# Patient Record
Sex: FEMALE | Race: WHITE | Employment: FULL TIME | URBAN - METROPOLITAN AREA
[De-identification: names, ages, dates, MRNs, and addresses within clinical notes are randomized per-mention and may not be internally consistent; named-entity substitution may affect disease eponyms.]

---

## 2017-01-05 ENCOUNTER — OFFICE VISIT (OUTPATIENT)
Dept: INTERNAL MEDICINE CLINIC | Age: 62
End: 2017-01-05

## 2017-01-05 DIAGNOSIS — M54.12 CERVICAL RADICULOPATHY: Primary | ICD-10-CM

## 2017-01-05 PROCEDURE — 99213 OFFICE O/P EST LOW 20 MIN: CPT | Performed by: NURSE PRACTITIONER

## 2017-01-05 RX ORDER — PREDNISONE 20 MG/1
20 TABLET ORAL DAILY
Qty: 5 TABLET | Refills: 0 | Status: SHIPPED | OUTPATIENT
Start: 2017-01-05 | End: 2017-01-10

## 2017-01-08 VITALS
BODY MASS INDEX: 40.72 KG/M2 | HEART RATE: 78 BPM | SYSTOLIC BLOOD PRESSURE: 130 MMHG | WEIGHT: 202 LBS | RESPIRATION RATE: 16 BRPM | DIASTOLIC BLOOD PRESSURE: 80 MMHG | HEIGHT: 59 IN

## 2017-01-19 ENCOUNTER — OFFICE VISIT (OUTPATIENT)
Dept: INTERNAL MEDICINE CLINIC | Age: 62
End: 2017-01-19

## 2017-01-19 VITALS — HEIGHT: 59 IN | WEIGHT: 197 LBS | BODY MASS INDEX: 39.72 KG/M2

## 2017-01-19 DIAGNOSIS — J01.90 ACUTE SINUSITIS, RECURRENCE NOT SPECIFIED, UNSPECIFIED LOCATION: Primary | ICD-10-CM

## 2017-01-19 PROCEDURE — 99213 OFFICE O/P EST LOW 20 MIN: CPT | Performed by: NURSE PRACTITIONER

## 2017-01-19 RX ORDER — AMOXICILLIN AND CLAVULANATE POTASSIUM 875; 125 MG/1; MG/1
1 TABLET, FILM COATED ORAL 2 TIMES DAILY
Qty: 14 TABLET | Refills: 0 | Status: SHIPPED | OUTPATIENT
Start: 2017-01-19 | End: 2017-01-26

## 2017-01-19 ASSESSMENT — ENCOUNTER SYMPTOMS
DIARRHEA: 0
SINUS PAIN: 1
VOMITING: 0
SORE THROAT: 1
WHEEZING: 0
NAUSEA: 0
VOICE CHANGE: 1
RHINORRHEA: 1
COUGH: 1
SWOLLEN GLANDS: 0

## 2017-01-26 ENCOUNTER — OFFICE VISIT (OUTPATIENT)
Dept: INTERNAL MEDICINE CLINIC | Age: 62
End: 2017-01-26

## 2017-01-26 VITALS
DIASTOLIC BLOOD PRESSURE: 80 MMHG | HEART RATE: 70 BPM | RESPIRATION RATE: 14 BRPM | HEIGHT: 59 IN | SYSTOLIC BLOOD PRESSURE: 110 MMHG | WEIGHT: 195 LBS | BODY MASS INDEX: 39.31 KG/M2

## 2017-01-26 DIAGNOSIS — E66.01 MORBID OBESITY WITH BMI OF 40.0-44.9, ADULT (HCC): ICD-10-CM

## 2017-01-26 DIAGNOSIS — J30.2 SEASONAL ALLERGIC RHINITIS, UNSPECIFIED ALLERGIC RHINITIS TRIGGER: ICD-10-CM

## 2017-01-26 DIAGNOSIS — T78.3XXS: ICD-10-CM

## 2017-01-26 DIAGNOSIS — R74.8 ELEVATED ALKALINE PHOSPHATASE LEVEL: ICD-10-CM

## 2017-01-26 DIAGNOSIS — E78.2 MIXED HYPERLIPIDEMIA: ICD-10-CM

## 2017-01-26 DIAGNOSIS — J45.20 MILD INTERMITTENT ASTHMA WITHOUT COMPLICATION: Primary | ICD-10-CM

## 2017-01-26 PROCEDURE — 99214 OFFICE O/P EST MOD 30 MIN: CPT | Performed by: INTERNAL MEDICINE

## 2017-01-26 RX ORDER — ATORVASTATIN CALCIUM 40 MG/1
TABLET, FILM COATED ORAL
Qty: 30 TABLET | Refills: 2 | Status: SHIPPED | OUTPATIENT
Start: 2017-01-26 | End: 2017-04-28 | Stop reason: SDUPTHER

## 2017-01-26 RX ORDER — FLUTICASONE PROPIONATE 50 MCG
2 SPRAY, SUSPENSION (ML) NASAL DAILY
Qty: 1 BOTTLE | Refills: 2 | Status: SHIPPED | OUTPATIENT
Start: 2017-01-26

## 2017-01-26 RX ORDER — TRIAMTERENE AND HYDROCHLOROTHIAZIDE 75; 50 MG/1; MG/1
TABLET ORAL
Qty: 30 TABLET | Refills: 2 | Status: SHIPPED | OUTPATIENT
Start: 2017-01-26 | End: 2017-04-28 | Stop reason: SDUPTHER

## 2017-01-26 ASSESSMENT — ENCOUNTER SYMPTOMS
NAUSEA: 0
RHINORRHEA: 0
BACK PAIN: 0
SHORTNESS OF BREATH: 0
VOMITING: 0
CHEST TIGHTNESS: 0
ABDOMINAL PAIN: 0
WHEEZING: 0

## 2017-04-28 ENCOUNTER — OFFICE VISIT (OUTPATIENT)
Dept: INTERNAL MEDICINE CLINIC | Age: 62
End: 2017-04-28

## 2017-04-28 VITALS
HEART RATE: 70 BPM | RESPIRATION RATE: 14 BRPM | WEIGHT: 196 LBS | DIASTOLIC BLOOD PRESSURE: 70 MMHG | SYSTOLIC BLOOD PRESSURE: 100 MMHG | HEIGHT: 59 IN | BODY MASS INDEX: 39.51 KG/M2

## 2017-04-28 DIAGNOSIS — R74.8 ELEVATED ALKALINE PHOSPHATASE LEVEL: ICD-10-CM

## 2017-04-28 DIAGNOSIS — T78.3XXS: ICD-10-CM

## 2017-04-28 DIAGNOSIS — J30.2 SEASONAL ALLERGIC RHINITIS, UNSPECIFIED ALLERGIC RHINITIS TRIGGER: ICD-10-CM

## 2017-04-28 DIAGNOSIS — E66.01 MORBID OBESITY WITH BMI OF 40.0-44.9, ADULT (HCC): ICD-10-CM

## 2017-04-28 DIAGNOSIS — E78.2 MIXED HYPERLIPIDEMIA: ICD-10-CM

## 2017-04-28 DIAGNOSIS — Z11.59 NEED FOR HEPATITIS C SCREENING TEST: ICD-10-CM

## 2017-04-28 DIAGNOSIS — Z11.4 SCREENING FOR HIV WITHOUT PRESENCE OF RISK FACTORS: ICD-10-CM

## 2017-04-28 DIAGNOSIS — J45.20 MILD INTERMITTENT ASTHMA WITHOUT COMPLICATION: Primary | ICD-10-CM

## 2017-04-28 PROCEDURE — 99214 OFFICE O/P EST MOD 30 MIN: CPT | Performed by: INTERNAL MEDICINE

## 2017-04-28 RX ORDER — VALACYCLOVIR HYDROCHLORIDE 1 G/1
1000 TABLET, FILM COATED ORAL 2 TIMES DAILY
Qty: 30 TABLET | Refills: 2 | Status: SHIPPED | OUTPATIENT
Start: 2017-04-28

## 2017-04-28 RX ORDER — TRIAMTERENE AND HYDROCHLOROTHIAZIDE 75; 50 MG/1; MG/1
TABLET ORAL
Qty: 30 TABLET | Refills: 2 | Status: SHIPPED | OUTPATIENT
Start: 2017-04-28

## 2017-04-28 RX ORDER — ATORVASTATIN CALCIUM 40 MG/1
TABLET, FILM COATED ORAL
Qty: 30 TABLET | Refills: 2 | Status: SHIPPED | OUTPATIENT
Start: 2017-04-28

## 2017-04-28 ASSESSMENT — ENCOUNTER SYMPTOMS
ABDOMINAL PAIN: 0
BACK PAIN: 0
CHEST TIGHTNESS: 0
WHEEZING: 0
VOMITING: 0
SHORTNESS OF BREATH: 0
RHINORRHEA: 0
NAUSEA: 0

## 2017-05-06 ENCOUNTER — HOSPITAL ENCOUNTER (OUTPATIENT)
Dept: OTHER | Age: 62
Discharge: HOME | End: 2017-05-06
Attending: INTERNAL MEDICINE | Admitting: INTERNAL MEDICINE

## 2017-05-06 LAB
A/G RATIO: 1.2 (ref 1.1–2.2)
ALBUMIN SERPL-MCNC: 3.8 G/DL (ref 3.4–5)
ALP BLD-CCNC: 147 U/L (ref 40–129)
ALT SERPL-CCNC: 16 U/L (ref 10–40)
ANION GAP SERPL CALCULATED.3IONS-SCNC: 14 MMOL/L (ref 3–16)
AST SERPL-CCNC: 18 U/L (ref 15–37)
BASOPHILS ABSOLUTE: 0.1 K/UL (ref 0–0.2)
BASOPHILS RELATIVE PERCENT: 0.9 %
BILIRUB SERPL-MCNC: 0.4 MG/DL (ref 0–1)
BUN BLDV-MCNC: 21 MG/DL (ref 7–20)
CALCIUM SERPL-MCNC: 9.2 MG/DL (ref 8.3–10.6)
CHLORIDE BLD-SCNC: 96 MMOL/L (ref 99–110)
CHOLESTEROL, TOTAL: 168 MG/DL (ref 0–199)
CO2: 31 MMOL/L (ref 21–32)
CREAT SERPL-MCNC: 1.1 MG/DL (ref 0.6–1.1)
EOSINOPHILS ABSOLUTE: 0.1 K/UL (ref 0–0.6)
EOSINOPHILS RELATIVE PERCENT: 1.7 %
GFR AFRICAN AMERICAN: >60
GFR NON-AFRICAN AMERICAN: 51
GLOBULIN: 3.3 G/DL
GLUCOSE BLD-MCNC: 110 MG/DL (ref 70–99)
HCT VFR BLD CALC: 39.2 % (ref 36–48)
HDLC SERPL-MCNC: 47 MG/DL (ref 40–60)
HEMOGLOBIN: 12.8 G/DL (ref 12–16)
HEPATITIS C ANTIBODY INTERPRETATION: NORMAL
LDL CHOLESTEROL CALCULATED: 74 MG/DL
LYMPHOCYTES ABSOLUTE: 1.7 K/UL (ref 1–5.1)
LYMPHOCYTES RELATIVE PERCENT: 24.5 %
MCH RBC QN AUTO: 26.6 PG (ref 26–34)
MCHC RBC AUTO-ENTMCNC: 32.8 G/DL (ref 31–36)
MCV RBC AUTO: 81.1 FL (ref 80–100)
MONOCYTES ABSOLUTE: 0.7 K/UL (ref 0–1.3)
MONOCYTES RELATIVE PERCENT: 9.5 %
NEUTROPHILS ABSOLUTE: 4.5 K/UL (ref 1.7–7.7)
NEUTROPHILS RELATIVE PERCENT: 63.4 %
PDW BLD-RTO: 14.5 % (ref 12.4–15.4)
PLATELET # BLD: 237 K/UL (ref 135–450)
PMV BLD AUTO: 8.8 FL (ref 5–10.5)
POTASSIUM SERPL-SCNC: 3.4 MMOL/L (ref 3.5–5.1)
RBC # BLD: 4.83 M/UL (ref 4–5.2)
SODIUM BLD-SCNC: 141 MMOL/L (ref 136–145)
TOTAL PROTEIN: 7.1 G/DL (ref 6.4–8.2)
TRIGL SERPL-MCNC: 234 MG/DL (ref 0–150)
VLDLC SERPL CALC-MCNC: 47 MG/DL
WBC # BLD: 7.1 K/UL (ref 4–11)

## 2017-05-07 LAB — HIV-1 AND HIV-2 ANTIBODIES: NEGATIVE

## 2017-05-30 ENCOUNTER — OFFICE VISIT (OUTPATIENT)
Dept: ORTHOPEDIC SURGERY | Age: 62
End: 2017-05-30

## 2017-05-30 VITALS
BODY MASS INDEX: 39.51 KG/M2 | HEIGHT: 59 IN | SYSTOLIC BLOOD PRESSURE: 124 MMHG | HEART RATE: 71 BPM | WEIGHT: 195.99 LBS | DIASTOLIC BLOOD PRESSURE: 72 MMHG

## 2017-05-30 DIAGNOSIS — M17.10 ARTHRITIS OF KNEE: ICD-10-CM

## 2017-05-30 DIAGNOSIS — M25.561 RIGHT KNEE PAIN, UNSPECIFIED CHRONICITY: Primary | ICD-10-CM

## 2017-05-30 PROCEDURE — 99203 OFFICE O/P NEW LOW 30 MIN: CPT | Performed by: ORTHOPAEDIC SURGERY

## 2017-05-30 PROCEDURE — 73562 X-RAY EXAM OF KNEE 3: CPT | Performed by: ORTHOPAEDIC SURGERY

## 2021-08-27 ENCOUNTER — APPOINTMENT (OUTPATIENT)
Dept: GENERAL RADIOLOGY | Age: 66
DRG: 177 | End: 2021-08-27
Payer: COMMERCIAL

## 2021-08-27 ENCOUNTER — HOSPITAL ENCOUNTER (INPATIENT)
Age: 66
LOS: 2 days | Discharge: HOME | DRG: 177 | End: 2021-08-29
Attending: EMERGENCY MEDICINE | Admitting: INTERNAL MEDICINE
Payer: COMMERCIAL

## 2021-08-27 DIAGNOSIS — J96.01 ACUTE RESPIRATORY FAILURE WITH HYPOXIA (HCC): ICD-10-CM

## 2021-08-27 DIAGNOSIS — U07.1 PNEUMONIA DUE TO COVID-19 VIRUS: Primary | ICD-10-CM

## 2021-08-27 DIAGNOSIS — J12.82 PNEUMONIA DUE TO COVID-19 VIRUS: Primary | ICD-10-CM

## 2021-08-27 LAB
A/G RATIO: 0.9 (ref 1.1–2.2)
ALBUMIN SERPL-MCNC: 3.4 G/DL (ref 3.4–5)
ALP BLD-CCNC: 141 U/L (ref 40–129)
ALT SERPL-CCNC: 23 U/L (ref 10–40)
ANION GAP SERPL CALCULATED.3IONS-SCNC: 15 MMOL/L (ref 3–16)
AST SERPL-CCNC: 36 U/L (ref 15–37)
BASE EXCESS VENOUS: 1.6 MMOL/L (ref -3–3)
BASOPHILS ABSOLUTE: 0 K/UL (ref 0–0.2)
BASOPHILS RELATIVE PERCENT: 1 %
BILIRUB SERPL-MCNC: 0.5 MG/DL (ref 0–1)
BUN BLDV-MCNC: 26 MG/DL (ref 7–20)
CALCIUM SERPL-MCNC: 9.5 MG/DL (ref 8.3–10.6)
CARBOXYHEMOGLOBIN: 4.9 % (ref 0–1.5)
CHLORIDE BLD-SCNC: 97 MMOL/L (ref 99–110)
CO2: 25 MMOL/L (ref 21–32)
CREAT SERPL-MCNC: 1.3 MG/DL (ref 0.6–1.2)
EKG ATRIAL RATE: 63 BPM
EKG DIAGNOSIS: NORMAL
EKG P AXIS: 34 DEGREES
EKG P-R INTERVAL: 138 MS
EKG Q-T INTERVAL: 418 MS
EKG QRS DURATION: 78 MS
EKG QTC CALCULATION (BAZETT): 427 MS
EKG R AXIS: 48 DEGREES
EKG T AXIS: 50 DEGREES
EKG VENTRICULAR RATE: 63 BPM
EOSINOPHILS ABSOLUTE: 0 K/UL (ref 0–0.6)
EOSINOPHILS RELATIVE PERCENT: 0.6 %
GFR AFRICAN AMERICAN: 50
GFR NON-AFRICAN AMERICAN: 41
GLOBULIN: 3.7 G/DL
GLUCOSE BLD-MCNC: 120 MG/DL (ref 70–99)
HCO3 VENOUS: 22.4 MMOL/L (ref 23–29)
HCT VFR BLD CALC: 38.4 % (ref 36–48)
HEMOGLOBIN: 12.4 G/DL (ref 12–16)
INFLUENZA A: NOT DETECTED
INFLUENZA B: NOT DETECTED
LACTIC ACID: 1.2 MMOL/L (ref 0.4–2)
LYMPHOCYTES ABSOLUTE: 0.7 K/UL (ref 1–5.1)
LYMPHOCYTES RELATIVE PERCENT: 18.5 %
MAGNESIUM: 1.8 MG/DL (ref 1.8–2.4)
MCH RBC QN AUTO: 26.5 PG (ref 26–34)
MCHC RBC AUTO-ENTMCNC: 32.3 G/DL (ref 31–36)
MCV RBC AUTO: 82 FL (ref 80–100)
METHEMOGLOBIN VENOUS: 0.3 %
MONOCYTES ABSOLUTE: 0.5 K/UL (ref 0–1.3)
MONOCYTES RELATIVE PERCENT: 14.8 %
NEUTROPHILS ABSOLUTE: 2.4 K/UL (ref 1.7–7.7)
NEUTROPHILS RELATIVE PERCENT: 65.1 %
O2 CONTENT, VEN: 18 VOL %
O2 SAT, VEN: 99 %
O2 THERAPY: ABNORMAL
PCO2, VEN: 25.5 MMHG (ref 40–50)
PDW BLD-RTO: 16.1 % (ref 12.4–15.4)
PH VENOUS: 7.56 (ref 7.35–7.45)
PLATELET # BLD: 197 K/UL (ref 135–450)
PMV BLD AUTO: 8.1 FL (ref 5–10.5)
PO2, VEN: 111.2 MMHG (ref 25–40)
POTASSIUM REFLEX MAGNESIUM: 3.2 MMOL/L (ref 3.5–5.1)
RBC # BLD: 4.69 M/UL (ref 4–5.2)
SARS-COV-2 RNA, RT PCR: DETECTED
SODIUM BLD-SCNC: 137 MMOL/L (ref 136–145)
TCO2 CALC VENOUS: 23 MMOL/L
TOTAL PROTEIN: 7.1 G/DL (ref 6.4–8.2)
TROPONIN: <0.01 NG/ML
WBC # BLD: 3.7 K/UL (ref 4–11)

## 2021-08-27 PROCEDURE — 99283 EMERGENCY DEPT VISIT LOW MDM: CPT

## 2021-08-27 PROCEDURE — 99255 IP/OBS CONSLTJ NEW/EST HI 80: CPT | Performed by: INTERNAL MEDICINE

## 2021-08-27 PROCEDURE — 96375 TX/PRO/DX INJ NEW DRUG ADDON: CPT

## 2021-08-27 PROCEDURE — 87636 SARSCOV2 & INF A&B AMP PRB: CPT

## 2021-08-27 PROCEDURE — 82803 BLOOD GASES ANY COMBINATION: CPT

## 2021-08-27 PROCEDURE — 6360000002 HC RX W HCPCS: Performed by: PHYSICIAN ASSISTANT

## 2021-08-27 PROCEDURE — 93005 ELECTROCARDIOGRAM TRACING: CPT | Performed by: PHYSICIAN ASSISTANT

## 2021-08-27 PROCEDURE — 83735 ASSAY OF MAGNESIUM: CPT

## 2021-08-27 PROCEDURE — 71045 X-RAY EXAM CHEST 1 VIEW: CPT

## 2021-08-27 PROCEDURE — 84484 ASSAY OF TROPONIN QUANT: CPT

## 2021-08-27 PROCEDURE — 85025 COMPLETE CBC W/AUTO DIFF WBC: CPT

## 2021-08-27 PROCEDURE — 2580000003 HC RX 258: Performed by: PHYSICIAN ASSISTANT

## 2021-08-27 PROCEDURE — 93010 ELECTROCARDIOGRAM REPORT: CPT | Performed by: INTERNAL MEDICINE

## 2021-08-27 PROCEDURE — 1200000000 HC SEMI PRIVATE

## 2021-08-27 PROCEDURE — 2500000003 HC RX 250 WO HCPCS: Performed by: PHYSICIAN ASSISTANT

## 2021-08-27 PROCEDURE — 36415 COLL VENOUS BLD VENIPUNCTURE: CPT

## 2021-08-27 PROCEDURE — 80053 COMPREHEN METABOLIC PANEL: CPT

## 2021-08-27 PROCEDURE — 83605 ASSAY OF LACTIC ACID: CPT

## 2021-08-27 PROCEDURE — 96374 THER/PROPH/DIAG INJ IV PUSH: CPT

## 2021-08-27 RX ORDER — POTASSIUM CHLORIDE 20 MEQ/1
40 TABLET, EXTENDED RELEASE ORAL PRN
Status: DISCONTINUED | OUTPATIENT
Start: 2021-08-27 | End: 2021-08-29 | Stop reason: HOSPADM

## 2021-08-27 RX ORDER — 0.9 % SODIUM CHLORIDE 0.9 %
30 INTRAVENOUS SOLUTION INTRAVENOUS PRN
Status: DISCONTINUED | OUTPATIENT
Start: 2021-08-27 | End: 2021-08-29 | Stop reason: HOSPADM

## 2021-08-27 RX ORDER — POTASSIUM CHLORIDE 7.45 MG/ML
10 INJECTION INTRAVENOUS ONCE
Status: DISCONTINUED | OUTPATIENT
Start: 2021-08-27 | End: 2021-08-27 | Stop reason: CLARIF

## 2021-08-27 RX ORDER — DEXAMETHASONE SODIUM PHOSPHATE 10 MG/ML
6 INJECTION, SOLUTION INTRAMUSCULAR; INTRAVENOUS ONCE
Status: COMPLETED | OUTPATIENT
Start: 2021-08-27 | End: 2021-08-27

## 2021-08-27 RX ORDER — POTASSIUM CHLORIDE 7.45 MG/ML
10 INJECTION INTRAVENOUS
Status: COMPLETED | OUTPATIENT
Start: 2021-08-27 | End: 2021-08-27

## 2021-08-27 RX ORDER — ATORVASTATIN CALCIUM 40 MG/1
40 TABLET, FILM COATED ORAL DAILY
Status: DISCONTINUED | OUTPATIENT
Start: 2021-08-27 | End: 2021-08-29 | Stop reason: HOSPADM

## 2021-08-27 RX ORDER — ACETAMINOPHEN 325 MG/1
650 TABLET ORAL EVERY 6 HOURS PRN
Status: DISCONTINUED | OUTPATIENT
Start: 2021-08-27 | End: 2021-08-29 | Stop reason: HOSPADM

## 2021-08-27 RX ORDER — GUAIFENESIN/DEXTROMETHORPHAN 100-10MG/5
5 SYRUP ORAL EVERY 4 HOURS PRN
Status: DISCONTINUED | OUTPATIENT
Start: 2021-08-27 | End: 2021-08-29 | Stop reason: HOSPADM

## 2021-08-27 RX ORDER — POTASSIUM CHLORIDE 7.45 MG/ML
10 INJECTION INTRAVENOUS PRN
Status: DISCONTINUED | OUTPATIENT
Start: 2021-08-27 | End: 2021-08-29 | Stop reason: HOSPADM

## 2021-08-27 RX ORDER — ACETAMINOPHEN 650 MG/1
650 SUPPOSITORY RECTAL EVERY 6 HOURS PRN
Status: DISCONTINUED | OUTPATIENT
Start: 2021-08-27 | End: 2021-08-29 | Stop reason: HOSPADM

## 2021-08-27 RX ADMIN — SODIUM CHLORIDE 30 ML: 9 INJECTION, SOLUTION INTRAVENOUS at 17:45

## 2021-08-27 RX ADMIN — POTASSIUM CHLORIDE 10 MEQ: 7.46 INJECTION, SOLUTION INTRAVENOUS at 14:30

## 2021-08-27 RX ADMIN — DEXAMETHASONE SODIUM PHOSPHATE 6 MG: 10 INJECTION, SOLUTION INTRAMUSCULAR; INTRAVENOUS at 13:12

## 2021-08-27 RX ADMIN — ENOXAPARIN SODIUM 30 MG: 30 INJECTION SUBCUTANEOUS at 21:43

## 2021-08-27 RX ADMIN — POTASSIUM CHLORIDE 10 MEQ: 7.46 INJECTION, SOLUTION INTRAVENOUS at 13:15

## 2021-08-27 RX ADMIN — REMDESIVIR 200 MG: 100 INJECTION, POWDER, LYOPHILIZED, FOR SOLUTION INTRAVENOUS at 17:45

## 2021-08-27 ASSESSMENT — PAIN SCALES - GENERAL: PAINLEVEL_OUTOF10: 0

## 2021-08-27 NOTE — CONSULTS
Reason for referral and CC: Covid 23 in unvaccinated individual    HISTORY OF PRESENT ILLNESS: 62-year-old female with a history of morbid obesity and prediabetes presented with shortness of breath. For the previous week she has had loss of taste and smell and some nausea and progressive shortness of breath. Her Covid test was positive. She was apparently briefly hypoxic in the emergency room placed on oxygen. However I took her off of her oxygen this evening and she has not required further O2. Past Medical History:   Diagnosis Date    Acute maxillary sinusitis 4/10/07    Allergic rhinitis, cause unspecified 1/21/08    CEWKN-24     Cyclic edema     Dizziness and giddiness 4/30/07    Fever blister 8/12/2011    Lumbar facet arthropathy 8/12/2011    Morbid obesity with BMI of 40.0-44.9, adult ([facility] [address] 5/7/2015    Other and unspecified hyperlipidemia 2/5/08    Unspecified asthma(493.90) 1/21/08     Past Surgical History:   Procedure Laterality Date    CARDIAC CATHETERIZATION  2/23/2011    Normal    COLONOSCOPY  7/25/2008    KNEE ARTHROSCOPY  2011     Family History  family history includes Diabetes in her father; Other in her father. Social History:  reports that she has never smoked. She has never used smokeless tobacco.   reports no history of alcohol use. ALLERGIES:  Patient is allergic to allegra [fexofenadine].   Continuous Infusions:    Scheduled Meds:   atorvastatin  40 mg Oral Daily    enoxaparin  30 mg Subcutaneous BID    dexamethasone  6 mg Oral Daily     PRN Meds:  acetaminophen **OR** acetaminophen, guaiFENesin-dextromethorphan, potassium chloride **OR** potassium alternative oral replacement **OR** potassium chloride    REVIEW OF SYSTEMS:  Constitutional: Negative for fever  HENT: Negative for sore throat  Eyes: Negative for redness   Respiratory: for dyspnea, cough  Cardiovascular: Negative for chest pain  Gastrointestinal: Negative for vomiting, +diarrhea   Genitourinary: Negative for hematuria   Musculoskeletal: Negative for arthralgias   Skin: Negative for rash  Neurological: Negative for syncope  Hematological: Negative for adenopathy  Psychiatric/Behavorial: Negative for anxiety    PHYSICAL EXAM: /73   Pulse 58   Temp 97.7 °F (36.5 °C) (Oral)   Resp 20   Ht 4' 10\" (1.473 m)   Wt 180 lb (81.6 kg)   SpO2 98%   BMI 37.62 kg/m²  on ra  Constitutional:  No acute distress. Eyes: PERRL. Conjunctivae anicteric. ENT: Normal nose. Normal tongue. Neck:  Trachea is midline. No thyroid tenderness. Respiratory: No accessory muscle usage. decreased breath sounds. No wheezes. No rales. No Rhonchi. Cardiovascular: Normal S1S2. No digit clubbing. No digit cyanosis. No LE edema. Gastrointestinal: No mass palpated. No tenderness palpated. No umbilical hernia. Lymphatic: No cervical or supraclavicular adenopathy. Skin: No rash on the exposed extremities. No Nodules or induration on exposed extremities. Psychiatric: No anxiety or Agitation. Alert and Oriented to person, place and time. CBC:   Recent Labs     08/27/21  1128   WBC 3.7*   HGB 12.4   HCT 38.4   MCV 82.0        BMP:   Recent Labs     08/27/21  1128      K 3.2*   CL 97*   CO2 25   BUN 26*   CREATININE 1.3*        Recent Labs     08/27/21  1128   AST 36   ALT 23   BILITOT 0.5   ALKPHOS 141*     No results for input(s): PROTIME, INR in the last 72 hours. No results for input(s): NITRITE, COLORU, PHUR, LABCAST, WBCUA, RBCUA, MUCUS, TRICHOMONAS, YEAST, BACTERIA, CLARITYU, SPECGRAV, LEUKOCYTESUR, UROBILINOGEN, BILIRUBINUR, BLOODU, GLUCOSEU, AMORPHOUS in the last 72 hours. Invalid input(s): KETONESU  No results for input(s): PHART, BCQ9MMS, PO2ART in the last 72 hours. Results for Angelito Jiménez (MRN [id number]) as of 8/27/2021 16:54   Ref.  Range 2/26/2016 00:00 5/6/2017 09:51 8/27/2021 13:16   SARS-CoV-2 RNA, RT PCR Latest Ref Range: NOT DETECTED    DETECTED (A)       Chest imaging was reviewed by me and showed CXR  Impression   Patchy bilateral ground-glass opacification consistent with multifocal   pneumonia including atypical viral pneumonia.          ASSESSMENT:  · COVID19 in unvaccinated individual  · Acute hypoxic respiratory failure  · SUNIL  · Leukopenia  · Obesity    PLAN:  Droplet Plus Airborne Precautions   Supplemental oxygen to keep saturation greater than 92%  Decadron 6 mg QD, D#1, monitor glucose closely  Remdesevir could be stopped given that she is on room air now  Lovenox twice daily    Thank you Corry Stinson, * for this consult

## 2021-08-27 NOTE — CONSULTS
Remdesivir orders per consult request:  200mg IVPB x1 now followed by 100mg IV Q24hrs x 4 additional doses. Continue to monitor LFTS, renal function and O2 requirements.   Santos Avila, [address] [facility] PharmD 8/27/2021 4:56 PM

## 2021-08-27 NOTE — ED PROVIDER NOTES
[address] ED  EMERGENCY DEPARTMENT ENCOUNTER        Pt Name: Marlene Melchor  MRN: [id number]  [place] 1955  Date of evaluation: 8/27/2021  Provider: VICENTE Mackey  PCP: Matt Martínez RN    This patient was seen and evaluated by the attending physician Johnny Wheeler MD.    I have evaluated this patient. My supervising physician was available for consultation. CHIEF COMPLAINT       Chief Complaint   Patient presents with    Positive For Covid-19     tested +covid urgent care monday, c/o sob       HISTORY OF PRESENT ILLNESS   (Location/Symptom, Timing/Onset, Context/Setting, Quality, Duration, Modifying Factors, Severity)  Note limiting factors. Marlene Melchor is a 61 y.o. female who presents via private vehicle from her home for evaluation of COVID-19. ED Course as of Aug 27 1448   Fri Aug 27, 2021   1214 She started with achiness, cough, anosmia last Thursday. She was dx with covid this Monday. She notes she feels very sob and has and she has been with severe nasusae and decreased appetite. Emesis x2. She denies hemoptysis. She denies fevers. She notes pleuritic chest pain. She enies lung problems at baseline. She was not vaccinated. [CS]   8260 Was 25/1.45 in July at last PCP eval     BUN(!): 26 [CS]   1554 She denies syncope. [CS]      ED Course User Index  [CS] Ijeoma Mackey       Nursing Notes were all reviewed and agreed with or any disagreements were addressed  in the HPI. Pt was seen during the [place] 19 pandemic. Appropriate PPE worn by ME during patient encounters. Pt seen during a time with constrained hospital bed capacity and other potential inpatient and outpatient resources were constrained due to the viral pandemic. REVIEW OF SYSTEMS    (2-9 systems for level 4, 10 or more for level 5)     Review of Systems    Positives and Pertinent negatives as per HPI.   Except as noted abovein the ROS, all other systems were reviewed and negative. PAST MEDICAL HISTORY     Past Medical History:   Diagnosis Date    Acute maxillary sinusitis 4/10/07    Allergic rhinitis, cause unspecified 1/21/08    WJAES-73     Cyclic edema     Dizziness and giddiness 4/30/07    Fever blister 8/12/2011    Lumbar facet arthropathy 8/12/2011    Morbid obesity with BMI of 40.0-44.9, adult ([address] 5/7/2015    Other and unspecified hyperlipidemia 2/5/08    Unspecified asthma(493.90) 1/21/08         SURGICAL HISTORY     Past Surgical History:   Procedure Laterality Date    CARDIAC CATHETERIZATION  2/23/2011    Normal    COLONOSCOPY  7/25/2008    KNEE ARTHROSCOPY  2011         CURRENTMEDICATIONS       Previous Medications    ACYCLOVIR (ZOVIRAX) 5 % OINTMENT    Apply topically 5 times daily.     ATORVASTATIN (LIPITOR) 40 MG TABLET    TAKE ONE TABLET BY MOUTH DAILY    ATORVASTATIN (LIPITOR) 40 MG TABLET    TAKE ONE TABLET BY MOUTH DAILY    FLUTICASONE (FLONASE) 50 MCG/ACT NASAL SPRAY    2 sprays by Nasal route daily    TRIAMTERENE-HYDROCHLOROTHIAZIDE (MAXZIDE) 75-50 MG PER TABLET    TAKE ONE TABLET BY MOUTH DAILY    TRIAMTERENE-HYDROCHLOROTHIAZIDE (MAXZIDE) 75-50 MG PER TABLET    TAKE ONE TABLET BY MOUTH DAILY    VALACYCLOVIR (VALTREX) 1 G TABLET    Take 1 tablet by mouth 2 times daily         ALLERGIES     Allegra [fexofenadine]    FAMILYHISTORY       Family History   Problem Relation Age of Onset    Diabetes Father     Other Father         COPD, MI          SOCIAL HISTORY       Social History     Socioeconomic History    Marital status:      Spouse name: None    Number of children: None    Years of education: None    Highest education level: None   Occupational History    None   Tobacco Use    Smoking status: Never Smoker    Smokeless tobacco: Never Used   Substance and Sexual Activity    Alcohol use: No    Drug use: Not Currently    Sexual activity: None   Other Topics Concern    None   Social History Narrative    None     Social Determinants of Health     Financial Resource Strain:     Difficulty of Paying Living Expenses:    Food Insecurity:     Worried About Running Out of Food in the Last Year:     [address] in the Last Year:    Transportation Needs:     Lack of Transportation (Medical):  Lack of Transportation (Non-Medical):    Physical Activity:     Days of Exercise per Week:     Minutes of Exercise per Session:    Stress:     Feeling of Stress :    Social Connections:     Frequency of Communication with Friends and Family:     Frequency of Social Gatherings with Friends and Family:     Attends Mu-ism Services:     Active Member of Clubs or Organizations:     Attends Club or Organization Meetings:     Marital Status:    Intimate Partner Violence:     Fear of Current or Ex-Partner:     Emotionally Abused:     Physically Abused:     Sexually Abused:        SCREENINGS             PHYSICAL EXAM    (up to 7 for level 4, 8 or more for level 5)     ED Triage Vitals [08/27/21 1056]   BP Temp Temp Source Pulse Resp SpO2 Height Weight   (!) 115/59 97.7 °F (36.5 °C) Oral 64 22 93 % 4' 10\" (1.473 m) 180 lb (81.6 kg)       Physical Exam  Vitals and nursing note reviewed. Constitutional:       General: She is awake. Appearance: She is well-developed. She is ill-appearing. She is not toxic-appearing or diaphoretic. Interventions: Nasal cannula in place. Comments: Satting in low 90s on room air at rest, with ambulation she desats to 85%. HENT:      Head: Normocephalic and atraumatic. Right Ear: External ear normal.      Left Ear: External ear normal.      Nose: Nose normal.      Mouth/Throat:      Mouth: Mucous membranes are moist.      Pharynx: Oropharynx is clear. Eyes:      General:         Right eye: No discharge. Left eye: No discharge. Extraocular Movements: Extraocular movements intact.       Conjunctiva/sclera: Conjunctivae normal.      Pupils: Pupils are equal, round, and reactive to light. Cardiovascular:      Rate and Rhythm: Regular rhythm. Tachycardia present. Pulses: Normal pulses. Heart sounds: Normal heart sounds. No murmur heard. No friction rub. No gallop. Pulmonary:      Effort: Pulmonary effort is normal. No respiratory distress. Breath sounds: Normal breath sounds. No wheezing or rales. Abdominal:      Palpations: Abdomen is soft. Tenderness: There is no abdominal tenderness. Musculoskeletal:      Cervical back: Normal range of motion and neck supple. No rigidity. Right lower leg: No edema. Left lower leg: No edema. Lymphadenopathy:      Cervical: No cervical adenopathy. Skin:     General: Skin is warm and dry. Capillary Refill: Capillary refill takes less than 2 seconds. Coloration: Skin is not jaundiced. Findings: No rash. Neurological:      General: No focal deficit present. Mental Status: She is alert, oriented to person, place, and time and easily aroused. Sensory: No sensory deficit. Motor: Weakness (Generalized) present. Psychiatric:         Mood and Affect: Mood normal.         Behavior: Behavior normal. Behavior is cooperative.          DIAGNOSTIC RESULTS   LABS:    Labs Reviewed   COVID-19 & INFLUENZA COMBO - Abnormal; Notable for the following components:       Result Value    SARS-CoV-2 RNA, RT PCR DETECTED (*)     All other components within normal limits    Narrative:     Performed at:  [facility],  [place]   Phone [phone]   CBC WITH AUTO DIFFERENTIAL - Abnormal; Notable for the following components:    WBC 3.7 (*)     RDW 16.1 (*)     Lymphocytes Absolute 0.7 (*)     All other components within normal limits    Narrative:     Performed at:  [facility],  [place] [facility]   Phone [phone]   COMPREHENSIVE METABOLIC PANEL W/ REFLEX TO MG FOR LOW K - Abnormal; Notable for the following components:    Potassium reflex Magnesium 3.2 (*)     Chloride 97 (*)     Glucose 120 (*)     BUN 26 (*)     CREATININE 1.3 (*)     GFR Non- 41 (*)     GFR African American 50 (*)     Albumin/Globulin Ratio 0.9 (*)     Alkaline Phosphatase 141 (*)     All other components within normal limits    Narrative:     Performed at:  [facility],  [place], [facility]   Phone [phone]   BLOOD GAS, VENOUS - Abnormal; Notable for the following components:    pH, [place] 7.561 (*)     pCO2, G[place] 25.5 (*)     pO2, [place] 111.2 (*)     HCO3, Venous 22.4 (*)     Carboxyhemoglobin 4.9 (*)     All other components within normal limits    Narrative:     Performed at:  [facility],  [place], [facility]   Phone [phone]   TROPONIN    Narrative:     Performed at:  [facility],  [place], [facility]   Phone [phone]   LACTIC ACID, PLASMA    Narrative:     Performed at:  [facility],  [place], [facility]   Phone [phone]   MAGNESIUM    Narrative:     Performed at:  [facility],  [place], [facility] [employer]   Phone [phone]       All other labs were within normal range or not returned as of this dictation. EKG: All EKG's are interpreted by the Emergency Department Physician who either signs orCo-signs this chart in the absence of a cardiologist.  Please see their note for interpretation of EKG.       RADIOLOGY:   Non-plain film images such as CT, Ultrasound and MRI are read by the radiologist. Plain radiographic images are visualized andpreliminarily interpreted by the  ED Provider with the below findings:        Interpretation perthe Radiologist below, if available at the time of this note:    XR CHEST PORTABLE   Final Result   Patchy bilateral ground-glass opacification consistent with multifocal   pneumonia including atypical viral pneumonia. XR CHEST PORTABLE    Result Date: 8/27/2021  EXAMINATION: ONE XRAY VIEW OF THE CHEST 8/27/2021 11:06 am COMPARISON: 01/21/2008 HISTORY: ORDERING SYSTEM PROVIDED HISTORY: Covid SOB TECHNOLOGIST PROVIDED HISTORY: Reason for exam:->Covid SOB Reason for Exam: COVID SOB Acuity: Acute Type of Exam: Initial FINDINGS: The cardiomediastinal silhouette is unremarkable. Patchy ground-glass opacification particularly in the peripheral aspect of the lungs consistent with multifocal pneumonia. There is no pleural fluid. Osseous structures are unremarkable. Patchy bilateral ground-glass opacification consistent with multifocal pneumonia including atypical viral pneumonia. PROCEDURES   Unless otherwise noted below, none     Procedures    CRITICAL CARE TIME   The total critical care time spent while evaluating and treating this patient was 30 minutes. This excludes time spent doing separately billable procedures. This includes time at the bedside, data interpretation, medication management, obtaining critical history from collateral sources if the patient is unable to provide it directly, and physician consultation. Specifics of interventions taken and potentially life-threatening diagnostic considerations are listed above in the medical decision making.           CONSULTS:  IP CONSULT TO HOSPITALIST  IP CONSULT TO PULMONOLOGY  IP CONSULT TO PHARMACY      EMERGENCY DEPARTMENT COURSE and DIFFERENTIALDIAGNOSIS/MDM:   Vitals:    Vitals:    08/27/21 1056 08/27/21 1132 08/27/21 1227 08/27/21 1320   BP: (!) 115/59   114/65   Pulse: 64   62   Resp: 22 28  20   Temp: 97.7 °F (36.5 °C)      TempSrc: Oral      SpO2: 93% (!) 88% 93% 100%   Weight: 180 lb (81.6 kg)      Height: 4' 10\" (1.473 m)          Patient was given thefollowing medications:  Medications   potassium chloride 10 mEq/100 mL IVPB (Peripheral Line) (10 mEq IntraVENous New Bag 8/27/21 1315)   dexamethasone (PF) (DECADRON) injection 6 mg (6 mg IntraVENous Given 8/27/21 1312)       PDMP Monitoring:    Last PDMP [place] as Reviewed [facility]):  Review User Review Instant Review Result            Urine Drug Screenings (1 yr)    No resulted procedures found. Medication Contract and Consent for Opioid Use Documents Filed      No documents found                MDM:   Patient seen and evaluated. Old records reviewed. Diagnostic testing reviewed and results discussed. 61-year-old female, Covid positive. She is hypoxic. Improved with 2 L nasal cannula. Started on 6 mg IV dexamethasone for the hypoxia in context of known COVID-19 infection. No evidence of significant hypercapnia, I do not believe she warrants BiPAP at this time, she is mentating appropriately. Mild SUNIL. Mild hypokalemia, potassium repleted in the department. Bilateral groundglass opacities consistent with Covid pneumonia. She confirmed with me she is a full code. All information including ED workup, results, treatment, diagnosis has been reviewed and discussed with ED attending physician and directly discussed with Hospitalist who is the admitting physician. Pt will be admitted in stable condition. Pt advised of admission and is in full agreement. Discharge Time out:  CC Reviewed Yes   Test Results Yes     Vitals:    08/27/21 1320   BP: 114/65   Pulse: 62   Resp: 20   Temp:    SpO2: 100%              FINAL IMPRESSION      1. Pneumonia due to COVID-19 virus    2. Acute respiratory failure with hypoxia [facility])          DISPOSITION/PLAN   DISPOSITION Admitted 08/27/2021 01:51:49 PM      PATIENT REFERREDTO:  No follow-up provider specified.     DISCHARGE MEDICATIONS:  New Prescriptions    No medications on file       DISCONTINUED MEDICATIONS:  Discontinued Medications    No medications on file              (Please note that portions ofthis note were completed with a voice recognition program.  Efforts were made to edit the dictations but occasionally words are mis-transcribed.)    Kenneth Griffiths [place] (electronically signed)      Aiden Sobeida GriffithsBanner Del [facility] Center  08/27/21 8380

## 2021-08-27 NOTE — PROGRESS NOTES
Patient admitted to room _226___ from ER. Patient oriented to room, call light, bed rails, phone, lights and bathroom. Patient instructed about the schedule of the day including: vital sign frequency, lab draws, possible tests, frequency of MD and staff rounds, daily weights, I &O's and prescribed diet. Bed alarm deferred patient low fall risk and refuses alarm. Telemetry box in place, patient aware of placement and reason. Bed locked, in lowest position, side rails up 2/4, call light within reach. Recliner Assessment:     Patient is able to demonstrate the ability to move from a reclining position to an upright position within the recliner. Bedside Mobility Assessment Tool (BMAT):     Assessment Level 1- Sit and Shake    1. From a semi-reclined position, ask patient to sit up and rotate to a seated position at the side of the bed. Can use the bedrail. 2. Ask patient to reach out and grab your hand and shake making sure patient reaches across his/her midline. Pass- Patient is able to come to a seated position, maintain core strength. Maintains seated balance while reaching across midline. Move on to Assessment Level 2. Assessment Level 2- Stretch and Point   1. With patient in seated position at the side of the bed, have patient place both feet on the floor (or stool) with knees no higher than hips. 2. Ask patient to stretch one leg and straighten the knee, then bend the ankle/flex and point the toes. If appropriate, repeat with the other leg. Pass- Patient is able to demonstrate appropriate quad strength on intended weight bearing limb(s). Move onto Assessment Level 3. Assessment Level 3- Stand   1. Ask patient to elevate off the bed or chair (seated to standing) using an assistive device (cane, bedrail). 2. Patient should be able to raise buttocks off be and hold for a count of five. May repeat once.    Pass- Patient maintains standing stability for at least 5 seconds, proceed to assessment level 4. Assessment Level 4- Walk   1. Ask patient to march in place at bedside. 2. Then ask patient to advance step and return each foot. Some medical conditions may render a patient from stepping backwards, use your best clinical judgement. Pass- Patient demonstrates balance while shifting weight and ability to step, takes independent steps, does not use assistive device patient is MOBILITY LEVEL 4. Mobility Level- 4        4 Eyes Skin Assessment     The patient is being assess for   Admission    I agree that 2 RN's have performed a thorough Head to Toe Skin Assessment on the patient. ALL assessment sites listed below have been assessed. Areas assessed for pressure by both nurses:   [x]   Head, Face, and Ears   [x]   Shoulders, Back, and Chest, Abdomen  [x]   Arms, Elbows, and Hands   [x]   Coccyx, Sacrum, and Ischium  [x]   Legs, Feet, and Heels    Scattered bruising. Skin Assessed Under all Medical Devices by both nurses:  N/A              All Mepilex Borders were peeled back and area peeked at by both nurses:  No: N/A  Please list where Mepilex Borders are located:  N/A             **SHARE this note so that the co-signing nurse is able to place an eSignature**    Co-signer eSignature: {Esignature:[id number]}    Does the Patient have Skin Breakdown related to pressure?   No     (Insert Photo hereN/A)         Alek Prevention initiated:  NA   Wound Care Orders initiated:  NA      [facility] nurse consulted for Pressure Injury (Stage 3,4, Unstageable, DTI, NWPT, Complex wounds)and New or Established Ostomies:  NA      Primary Nurse eSignature: Electronically signed by Andrea Dumont RN on 8/27/21 at 6:03 PM EDT

## 2021-08-27 NOTE — ED PROVIDER NOTES
ED Attending Attestation Note    This patient was seen by the advance practice provider. I have seen and examined the patient. I agree with the workup, evaluation, management, and diagnosis. The care plan has been discussed. My assessment reveals 61 y.o. female presenting with SOB. COVID 19 PNA. Hypoxic, necessitating 2 L nasal cannula. Steroids, admit to hospital medicine for COVID-19 with acute hypoxic respiratory failure. EKG interpreted by myself. Rate: normal  Rhythm: NSR  Axis: normal  Intervals: within normal limits  ST/T waves: Nonspecific ST/T wave abnormality  Comparison: no prior   Impression: NSR with nonspecific ST/T wave abnormality, cannot rule out inferior age-indeterminate infarct     XR CHEST PORTABLE   Final Result   Patchy bilateral ground-glass opacification consistent with multifocal   pneumonia including atypical viral pneumonia. For further details of the patient's emergency department visit, please see the advanced practice provider's documentation. Marrion Pallas, MD     This report has been produced using speech recognition software and may contain errors related to that system including errors in grammar, punctuation, and spelling, as well as words and phrases that may be inappropriate. If there are any questions or concerns please feel free to contact the dictating provider for clarification.        Marrion Pallas, MD  08/27/21 3193

## 2021-08-28 PROBLEM — E11.65 TYPE 2 DIABETES MELLITUS WITH HYPERGLYCEMIA, WITHOUT LONG-TERM CURRENT USE OF INSULIN (HCC): Status: ACTIVE | Noted: 2021-08-28

## 2021-08-28 PROBLEM — J96.00 ACUTE RESPIRATORY FAILURE DUE TO COVID-19 (HCC): Status: ACTIVE | Noted: 2021-08-28

## 2021-08-28 PROBLEM — U07.1 ACUTE RESPIRATORY FAILURE DUE TO COVID-19 (HCC): Status: ACTIVE | Noted: 2021-08-28

## 2021-08-28 LAB
A/G RATIO: 1 (ref 1.1–2.2)
ALBUMIN SERPL-MCNC: 3.5 G/DL (ref 3.4–5)
ALP BLD-CCNC: 136 U/L (ref 40–129)
ALT SERPL-CCNC: 24 U/L (ref 10–40)
ANION GAP SERPL CALCULATED.3IONS-SCNC: 11 MMOL/L (ref 3–16)
AST SERPL-CCNC: 34 U/L (ref 15–37)
BASOPHILS ABSOLUTE: 0 K/UL (ref 0–0.2)
BASOPHILS RELATIVE PERCENT: 0.3 %
BILIRUB SERPL-MCNC: 0.4 MG/DL (ref 0–1)
BILIRUBIN DIRECT: <0.2 MG/DL (ref 0–0.3)
BILIRUBIN, INDIRECT: NORMAL MG/DL (ref 0–1)
BUN BLDV-MCNC: 26 MG/DL (ref 7–20)
C-REACTIVE PROTEIN: 57 MG/L (ref 0–5.1)
CALCIUM SERPL-MCNC: 9.2 MG/DL (ref 8.3–10.6)
CHLORIDE BLD-SCNC: 98 MMOL/L (ref 99–110)
CO2: 25 MMOL/L (ref 21–32)
CREAT SERPL-MCNC: 1.1 MG/DL (ref 0.6–1.2)
EOSINOPHILS ABSOLUTE: 0 K/UL (ref 0–0.6)
EOSINOPHILS RELATIVE PERCENT: 0 %
GFR AFRICAN AMERICAN: >60
GFR NON-AFRICAN AMERICAN: 50
GLOBULIN: 3.5 G/DL
GLUCOSE BLD-MCNC: 147 MG/DL (ref 70–99)
GLUCOSE BLD-MCNC: 157 MG/DL (ref 70–99)
GLUCOSE BLD-MCNC: 164 MG/DL (ref 70–99)
HCT VFR BLD CALC: 37.1 % (ref 36–48)
HEMOGLOBIN: 12.4 G/DL (ref 12–16)
LYMPHOCYTES ABSOLUTE: 0.3 K/UL (ref 1–5.1)
LYMPHOCYTES RELATIVE PERCENT: 12.6 %
MAGNESIUM: 1.8 MG/DL (ref 1.8–2.4)
MCH RBC QN AUTO: 26.9 PG (ref 26–34)
MCHC RBC AUTO-ENTMCNC: 33.3 G/DL (ref 31–36)
MCV RBC AUTO: 80.7 FL (ref 80–100)
MONOCYTES ABSOLUTE: 0.2 K/UL (ref 0–1.3)
MONOCYTES RELATIVE PERCENT: 8.2 %
NEUTROPHILS ABSOLUTE: 2.1 K/UL (ref 1.7–7.7)
NEUTROPHILS RELATIVE PERCENT: 78.9 %
PDW BLD-RTO: 16.1 % (ref 12.4–15.4)
PERFORMED ON: ABNORMAL
PERFORMED ON: ABNORMAL
PLATELET # BLD: 215 K/UL (ref 135–450)
PMV BLD AUTO: 8.3 FL (ref 5–10.5)
POTASSIUM REFLEX MAGNESIUM: 3.3 MMOL/L (ref 3.5–5.1)
RBC # BLD: 4.6 M/UL (ref 4–5.2)
SODIUM BLD-SCNC: 134 MMOL/L (ref 136–145)
TOTAL PROTEIN: 7 G/DL (ref 6.4–8.2)
WBC # BLD: 2.6 K/UL (ref 4–11)

## 2021-08-28 PROCEDURE — 99255 IP/OBS CONSLTJ NEW/EST HI 80: CPT | Performed by: INTERNAL MEDICINE

## 2021-08-28 PROCEDURE — 2500000003 HC RX 250 WO HCPCS: Performed by: PHYSICIAN ASSISTANT

## 2021-08-28 PROCEDURE — 94761 N-INVAS EAR/PLS OXIMETRY MLT: CPT

## 2021-08-28 PROCEDURE — 83735 ASSAY OF MAGNESIUM: CPT

## 2021-08-28 PROCEDURE — 6370000000 HC RX 637 (ALT 250 FOR IP): Performed by: PHYSICIAN ASSISTANT

## 2021-08-28 PROCEDURE — 6360000002 HC RX W HCPCS: Performed by: PHYSICIAN ASSISTANT

## 2021-08-28 PROCEDURE — 80053 COMPREHEN METABOLIC PANEL: CPT

## 2021-08-28 PROCEDURE — 85025 COMPLETE CBC W/AUTO DIFF WBC: CPT

## 2021-08-28 PROCEDURE — 1200000000 HC SEMI PRIVATE

## 2021-08-28 PROCEDURE — 2580000003 HC RX 258: Performed by: PHYSICIAN ASSISTANT

## 2021-08-28 PROCEDURE — 99233 SBSQ HOSP IP/OBS HIGH 50: CPT | Performed by: INTERNAL MEDICINE

## 2021-08-28 PROCEDURE — 86140 C-REACTIVE PROTEIN: CPT

## 2021-08-28 PROCEDURE — 6370000000 HC RX 637 (ALT 250 FOR IP): Performed by: INTERNAL MEDICINE

## 2021-08-28 PROCEDURE — 6360000002 HC RX W HCPCS: Performed by: INTERNAL MEDICINE

## 2021-08-28 PROCEDURE — XW033E5 INTRODUCTION OF REMDESIVIR ANTI-INFECTIVE INTO PERIPHERAL VEIN, PERCUTANEOUS APPROACH, NEW TECHNOLOGY GROUP 5: ICD-10-PCS | Performed by: INTERNAL MEDICINE

## 2021-08-28 PROCEDURE — 2700000000 HC OXYGEN THERAPY PER DAY

## 2021-08-28 PROCEDURE — 36415 COLL VENOUS BLD VENIPUNCTURE: CPT

## 2021-08-28 RX ORDER — DEXTROSE MONOHYDRATE 50 MG/ML
100 INJECTION, SOLUTION INTRAVENOUS PRN
Status: DISCONTINUED | OUTPATIENT
Start: 2021-08-28 | End: 2021-08-29 | Stop reason: HOSPADM

## 2021-08-28 RX ORDER — DEXTROSE MONOHYDRATE 25 G/50ML
12.5 INJECTION, SOLUTION INTRAVENOUS PRN
Status: DISCONTINUED | OUTPATIENT
Start: 2021-08-28 | End: 2021-08-29 | Stop reason: HOSPADM

## 2021-08-28 RX ORDER — ZOLPIDEM TARTRATE 5 MG/1
5 TABLET ORAL NIGHTLY PRN
Status: DISCONTINUED | OUTPATIENT
Start: 2021-08-28 | End: 2021-08-29 | Stop reason: HOSPADM

## 2021-08-28 RX ORDER — PANTOPRAZOLE SODIUM 40 MG/1
40 TABLET, DELAYED RELEASE ORAL
Status: DISCONTINUED | OUTPATIENT
Start: 2021-08-29 | End: 2021-08-29 | Stop reason: HOSPADM

## 2021-08-28 RX ORDER — POTASSIUM CHLORIDE 20 MEQ/1
40 TABLET, EXTENDED RELEASE ORAL
Status: COMPLETED | OUTPATIENT
Start: 2021-08-28 | End: 2021-08-28

## 2021-08-28 RX ORDER — NICOTINE POLACRILEX 4 MG
15 LOZENGE BUCCAL PRN
Status: DISCONTINUED | OUTPATIENT
Start: 2021-08-28 | End: 2021-08-29 | Stop reason: HOSPADM

## 2021-08-28 RX ADMIN — ACETAMINOPHEN 650 MG: 325 TABLET ORAL at 09:04

## 2021-08-28 RX ADMIN — POTASSIUM CHLORIDE 40 MEQ: 1500 TABLET, EXTENDED RELEASE ORAL at 09:00

## 2021-08-28 RX ADMIN — ENOXAPARIN SODIUM 30 MG: 30 INJECTION SUBCUTANEOUS at 21:44

## 2021-08-28 RX ADMIN — ATORVASTATIN CALCIUM 40 MG: 40 TABLET, FILM COATED ORAL at 09:01

## 2021-08-28 RX ADMIN — ZOLPIDEM TARTRATE 5 MG: 5 TABLET ORAL at 21:45

## 2021-08-28 RX ADMIN — ENOXAPARIN SODIUM 30 MG: 30 INJECTION SUBCUTANEOUS at 09:01

## 2021-08-28 RX ADMIN — REMDESIVIR 100 MG: 100 INJECTION, POWDER, LYOPHILIZED, FOR SOLUTION INTRAVENOUS at 17:47

## 2021-08-28 RX ADMIN — INSULIN LISPRO 1 UNITS: 100 INJECTION, SOLUTION INTRAVENOUS; SUBCUTANEOUS at 17:48

## 2021-08-28 RX ADMIN — POTASSIUM CHLORIDE 40 MEQ: 1500 TABLET, EXTENDED RELEASE ORAL at 12:53

## 2021-08-28 RX ADMIN — DEXAMETHASONE 6 MG: 4 TABLET ORAL at 09:01

## 2021-08-28 ASSESSMENT — PAIN SCALES - GENERAL
PAINLEVEL_OUTOF10: 4
PAINLEVEL_OUTOF10: 0

## 2021-08-28 NOTE — PROGRESS NOTES
Had a discussion with pt about  visiting. Informed pt that he is not permitted to come to the floor or enter the room as she is not allowed visitors because she has covid. Pt became angry and argumentative. Informed her that clinical and security we notified if him coming to unit and going in the room. Pt had questions for the doctor as why she is now on oxygen and why she is getting FSBS. Explained she is on steroids and it can cause her blood sugars to go up. Told her she can refuse if she doesn't want it done. Told her Dr Maureen Olmstead checked her O2 sat when he was in the room and her oxygen dropped with ambulation there fore he put her on oxygen. Inform Dr Maureen Olmstead of pt refusing FSBS, we are to document when she refuses.

## 2021-08-28 NOTE — PLAN OF CARE
Problem: Airway Clearance - Ineffective  Goal: Achieve or maintain patent airway  Outcome: Ongoing     Problem: Gas Exchange - Impaired  Goal: Absence of hypoxia  Outcome: Ongoing     Problem: Gas Exchange - Impaired  Goal: Promote optimal lung function  Outcome: Ongoing     Problem: Breathing Pattern - Ineffective  Goal: Ability to achieve and maintain a regular respiratory rate  Outcome: Ongoing     Problem: Body Temperature -  Risk of, Imbalanced  Goal: Ability to maintain a body temperature within defined limits  Outcome: Ongoing     Problem:  Body Temperature -  Risk of, Imbalanced  Goal: Will regain or maintain usual level of consciousness  Outcome: Ongoing     Problem: Isolation Precautions - Risk of Spread of Infection  Goal: Prevent transmission of infection  Outcome: Ongoing     Problem: Nutrition Deficits  Goal: Optimize nutritional status  Outcome: Ongoing     Problem: Risk for Fluid Volume Deficit  Goal: Maintain normal heart rhythm  Outcome: Ongoing     Problem: Risk for Fluid Volume Deficit  Goal: Maintain absence of muscle cramping  Outcome: Ongoing     Problem: Loneliness or Risk for Loneliness  Goal: Demonstrate positive use of time alone when socialization is not possible  Outcome: Ongoing     Problem: Fatigue  Goal: Verbalize increase energy and improved vitality  Outcome: Ongoing     Problem: Patient Education: Go to Patient Education Activity  Goal: Patient/Family Education  Outcome: Ongoing

## 2021-08-28 NOTE — PROGRESS NOTES
dropped her oxygen saturation down to 85% and became dyspneic with ambulation to the bathroom once. I placed her on 2 L of oxygen. #COVID-19 pneumonia. She was started on remdesivir and Decadron. Monitor LFTs and CBC. Today is Decadron day 2. Today is day 2 of remdesivir. Pulmonology consulted. Consider Tocilizumab if she worsens. #Obesity. Weight loss recommended as outpatient. #Hypokalemia. Replace prn. #Diabetes mellitus type 2. Use sliding scale insulin. #Asthma. Appears to be stable. #Hyperlipidemia. On Lipitor. #Lovenox 30 mg subcu twice daily. #Insomnia. Use Ambien. IMPORTANT: Please note that some portions of this note may have been created using Dragon voice recognition software. Some \"sound-alike\" and totally wrong word substitutions may have taken place due to known inherent limitations of any such software, including this voice recognition software. In spite of efforts to eliminate such errors, some may not have been corrected. So please read the note with this in mind and recognize such mistakes and understand the correct version using the  context. If there are still uncertainties in the mind of the medical provider reading this note about any aspect of the note, the provider can feel free to contact me. Thanks.      Jolene Bolden MD, MD 8/28/2021 9:01 AM

## 2021-08-28 NOTE — PROGRESS NOTES
Pt A/O in bed and quiet. Denies any needs at this time. SR up x2, bed in lowest position, wheels locked. Call light and bedside table in easy reach.    Arnie Redmond, RN, RN

## 2021-08-28 NOTE — PROGRESS NOTES
Spouse to floor entered room, attempted to stop as she is in covid precautions. Visitor refused. Replied \" It's my wife and I am going in the room. \" He also said he went in room yesterday. Dr Ramon Moise and charge nurse present and saw him enter the room. Charge nurse attempted to talk to him he became verbally aggressive and angry. Left unit. Clinical and  notified.

## 2021-08-28 NOTE — CARE COORDINATION
Case Management Assessment  Initial Evaluation      Patient Name: Marlene Melchor  YOB: 1955  Diagnosis: Acute respiratory failure with hypoxia ([facility] [address] [J96.01]  Pneumonia due to COVID-19 virus [U07.1, J12.82]  COVID-19 [U07.1]  Date / Time: 8/27/2021 10:46 AM    Admission status/Date: IP  Chart Reviewed: Yes      Patient Interviewed: Yes   Family Interviewed:  No      Hospitalization in the last 30 days:  No      Health Care Decision Maker :   Primary Decision MakerGeovani Dean - Child - [phone]    Secondary Decision Maker: Niraj Harrison - [phone] 18 02 19    (CM - must 1st enter selection under Navigator - emergency contact- [place] Relationship and pick relationship)   Who do you trust or have selected to make healthcare decisions for you      Met with: patient  Martine Poon conducted  (bedside/phone): phone    Current PCP: Facundo Mancini  family to bring card  Precert required for SNF :          3 night stay required -    ADLS  Support Systems/Care Needs: Spouse/Significant Other, Family Members  Transportation: self    Meal Preparation: self    Housing  Living Arrangements: lives w/ SO, Cameron.  +Works, Drives  PVDQX:4-6 into house, remains on 1st floor of 2 story house  Intent for return to present living arrangements: Yes  Identified Issues: NA    Home Care Information  Active with [address] : No Agency:(Services)  Type of Home Care Services: None  Passport/Waiver : No  :                      Phone Number:    Passport/Waiver Services: NA          Durable Medical Equiptment   DME Provider: SALLY  Equipment: SALLY  Walker___Cane___RTS___ BSC___Shower Chair___Hospital Bed___W/C____Other________  02 at ____Liter(s)---wears(frequency)_______ HHN ___ CPAP___ BiPap___   N/A____      Home O2 Use :  No  +C19 - following for home O2 needs, currently requires supplemental O2 @2 liters      Community Service Affiliation  Dialysis:  No · Agency:  · Location:  · Dialysis Schedule:  · Phone:   · Fax: Other Community Services: (ex:PT/OT,Mental Health,Wound Clinic, Cardio/Pul [address])    DISCHARGE PLAN: Explained Case Management role/services. Chart reviewed. Met with pt by phone due to C19 restrictions and explained the role of the CM. IPTA. Plans to return home. Cm following for possible home O2 needs.

## 2021-08-28 NOTE — CONSULTS
UROBILINOGEN, BILIRUBINUR, BLOODU, GLUCOSEU, AMORPHOUS in the last 72 hours. Invalid input(s): KETONESU  No results for input(s): PHART, MVC0EZF, PO2ART in the last 72 hours. Chest imaging was reviewed by me and showed 8/27/21 CXR  Patchy bilateral ground-glass opacification consistent with multifocal   pneumonia including atypical viral pneumonia. ASSESSMENT:  · Covid 19 in unvaccinated individual  · Acute hypoxic respiratory failure  · Leukopenia  · SUNIL  · DM2  · Obesity    PLAN:  Droplet Plus Airborne Precautions   Supplemental oxygen to keep saturation greater than 92%  Decadron 6 mg QD, D#1, monitor glucose closely  Remdesevir D#1, daily monitoring of LFT to prevent liver toxicity. Risk and benefits of this medication were specifically discussed with the patient.   Lovenox twice daily      Thank you Arianna Meyer, * for this consult

## 2021-08-28 NOTE — PROGRESS NOTES
Pt resting with eyes closed, respirs witnessed as e/e, no signs of distress. SR up x2, bed in lowest  position, wheels locked. Call light and bedside table in easy reach.    Arthur Sheriff, RN, RN

## 2021-08-28 NOTE — ACP (ADVANCE CARE PLANNING)
Advance Care Planning   Healthcare Decision Maker:    Primary Decision Maker: Curtis Currie [facility] - [phone]    Secondary Decision Maker: Rupert Bar - [phone] 18 02 19      Today we documented Decision Maker(s) consistent with Legal Next of Kin hierarchy.

## 2021-08-28 NOTE — H&P
1,000 mg by mouth 2 times daily as needed  4/28/17   Leonie Santos MD   fluticasone (FLONASE) 50 MCG/ACT nasal spray 2 sprays by Nasal route daily  Patient taking differently: 2 sprays by Nasal route daily as needed  1/26/17   Leonie Santos MD       Allergies:  Allegra [fexofenadine]    Social History:         TOBACCO:   reports that she has never smoked. She has never used smokeless tobacco.  ETOH:   reports no history of alcohol use. Family History:      (+) premature CAD - Father with AMI at age 62        Problem Relation Age of Onset    Diabetes Father     Other Father         COPD, MI       REVIEW OF SYSTEMS:   Pertinent positives as noted in the HPI. All other systems reviewed and negative. PHYSICAL EXAM PERFORMED:    BP 98/63   Pulse 60   Temp 97.7 °F (36.5 °C) (Oral)   Resp 18   Ht 4' 10\" (1.473 m)   Wt 180 lb (81.6 kg)   SpO2 95%   BMI 37.62 kg/m²     General appearance:  No apparent distress, appears stated age and cooperative. HEENT:  Normal cephalic, atraumatic without obvious deformity. Pupils equal, round,  Extra ocular muscles intact. Conjunctivae/corneas clear. Neck: Supple, with full range of motion. No jugular venous distention. Trachea midline. Respiratory:  Normal respiratory effort. No use of accessory muscles, no intercostal retractions  Cardiovascular:  Regular rate and rhythm    Abdomen: Soft, non-tender, non-distended   Musculoskeletal:  No clubbing, cyanosis or edema bilaterally. Full range of motion without deformity.   Skin: Skin color, texture, turgor normal.    Neurologic:  grossly non-focal.  Psychiatric:  Alert and oriented, thought content appropriate, normal insight         Labs:     Recent Labs     08/27/21  1128   WBC 3.7*   HGB 12.4   HCT 38.4        Recent Labs     08/27/21  1128      K 3.2*   CL 97*   CO2 25   BUN 26*   CREATININE 1.3*   CALCIUM 9.5     Recent Labs     08/27/21  1128   AST 36   ALT 23   BILITOT 0.5   ALKPHOS 141*     No results for input(s): INR in the last 72 hours. Recent Labs     08/27/21  1128   TROPONINI <0.01       Urinalysis:      Lab Results   Component Value Date    BLOODU neg 02/26/2016    GLUCOSEU neg 02/26/2016       Radiology:          XR CHEST PORTABLE   Final Result   Patchy bilateral ground-glass opacification consistent with multifocal   pneumonia including atypical viral pneumonia. ASSESSMENT:    Active Hospital Problems    Diagnosis Date Noted    COVID-19 [U07.1] 08/27/2021         PLAN:      1) Covid PNA  - initially 88% on room air  - decadron, remdesivir    2) HLD  - continue statin therapy, trend lft    3) HTN  - holding home meds, relatively hypotensive    DVT Prophylaxis:   Diet: ADULT DIET; Regular  Code Status: No Order         Sera Khoury MD    Thank you Ana Cota RN for the opportunity to be involved in this patient's care. If you have any questions or concerns please feel free to contact me at [phone].

## 2021-08-29 VITALS
DIASTOLIC BLOOD PRESSURE: 63 MMHG | HEART RATE: 52 BPM | TEMPERATURE: 97.5 F | RESPIRATION RATE: 18 BRPM | BODY MASS INDEX: 37.79 KG/M2 | OXYGEN SATURATION: 96 % | WEIGHT: 180 LBS | HEIGHT: 58 IN | SYSTOLIC BLOOD PRESSURE: 126 MMHG

## 2021-08-29 LAB
ALBUMIN SERPL-MCNC: 3.1 G/DL (ref 3.4–5)
ALP BLD-CCNC: 109 U/L (ref 40–129)
ALT SERPL-CCNC: 23 U/L (ref 10–40)
AST SERPL-CCNC: 24 U/L (ref 15–37)
BILIRUB SERPL-MCNC: <0.2 MG/DL (ref 0–1)
BILIRUBIN DIRECT: <0.2 MG/DL (ref 0–0.3)
BILIRUBIN, INDIRECT: ABNORMAL MG/DL (ref 0–1)
GLUCOSE BLD-MCNC: 101 MG/DL (ref 70–99)
PERFORMED ON: ABNORMAL
TOTAL PROTEIN: 6.3 G/DL (ref 6.4–8.2)

## 2021-08-29 PROCEDURE — 6370000000 HC RX 637 (ALT 250 FOR IP): Performed by: PHYSICIAN ASSISTANT

## 2021-08-29 PROCEDURE — 36415 COLL VENOUS BLD VENIPUNCTURE: CPT

## 2021-08-29 PROCEDURE — 6360000002 HC RX W HCPCS: Performed by: PHYSICIAN ASSISTANT

## 2021-08-29 PROCEDURE — 80076 HEPATIC FUNCTION PANEL: CPT

## 2021-08-29 PROCEDURE — 99232 SBSQ HOSP IP/OBS MODERATE 35: CPT | Performed by: INTERNAL MEDICINE

## 2021-08-29 PROCEDURE — 99239 HOSP IP/OBS DSCHRG MGMT >30: CPT | Performed by: INTERNAL MEDICINE

## 2021-08-29 PROCEDURE — 6360000002 HC RX W HCPCS: Performed by: INTERNAL MEDICINE

## 2021-08-29 PROCEDURE — 6370000000 HC RX 637 (ALT 250 FOR IP): Performed by: INTERNAL MEDICINE

## 2021-08-29 RX ORDER — GUAIFENESIN/DEXTROMETHORPHAN 100-10MG/5
5 SYRUP ORAL EVERY 4 HOURS PRN
Qty: 120 ML | Refills: 0 | Status: SHIPPED | OUTPATIENT
Start: 2021-08-29 | End: 2021-09-08

## 2021-08-29 RX ORDER — DEXAMETHASONE 6 MG/1
6 TABLET ORAL DAILY
Qty: 9 TABLET | Refills: 0 | Status: SHIPPED | OUTPATIENT
Start: 2021-08-29 | End: 2021-09-07

## 2021-08-29 RX ORDER — PANTOPRAZOLE SODIUM 40 MG/1
40 TABLET, DELAYED RELEASE ORAL
Qty: 30 TABLET | Refills: 0
Start: 2021-08-30

## 2021-08-29 RX ADMIN — ENOXAPARIN SODIUM 30 MG: 30 INJECTION SUBCUTANEOUS at 09:39

## 2021-08-29 RX ADMIN — ATORVASTATIN CALCIUM 40 MG: 40 TABLET, FILM COATED ORAL at 09:39

## 2021-08-29 RX ADMIN — DEXAMETHASONE 6 MG: 4 TABLET ORAL at 09:39

## 2021-08-29 RX ADMIN — PANTOPRAZOLE SODIUM 40 MG: 40 TABLET, DELAYED RELEASE ORAL at 06:02

## 2021-08-29 NOTE — PLAN OF CARE
Problem: Airway Clearance - Ineffective  Goal: Achieve or maintain patent airway  Outcome: Completed     Problem: Gas Exchange - Impaired  Goal: Absence of hypoxia  Outcome: Completed  Goal: Promote optimal lung function  Outcome: Completed     Problem: Breathing Pattern - Ineffective  Goal: Ability to achieve and maintain a regular respiratory rate  Outcome: Completed     Problem:  Body Temperature -  Risk of, Imbalanced  Goal: Ability to maintain a body temperature within defined limits  Outcome: Completed  Goal: Will regain or maintain usual level of consciousness  Outcome: Completed  Goal: Complications related to the disease process, condition or treatment will be avoided or minimized  Outcome: Completed     Problem: Isolation Precautions - Risk of Spread of Infection  Goal: Prevent transmission of infection  Outcome: Completed     Problem: Nutrition Deficits  Goal: Optimize nutritional status  Outcome: Completed     Problem: Risk for Fluid Volume Deficit  Goal: Maintain normal heart rhythm  Outcome: Completed  Goal: Maintain absence of muscle cramping  Outcome: Completed  Goal: Maintain normal serum potassium, sodium, calcium, phosphorus, and pH  Outcome: Completed     Problem: Loneliness or Risk for Loneliness  Goal: Demonstrate positive use of time alone when socialization is not possible  Outcome: Completed     Problem: Fatigue  Goal: Verbalize increase energy and improved vitality  Outcome: Completed     Problem: Patient Education: Go to Patient Education Activity  Goal: Patient/Family Education  Outcome: Completed

## 2021-08-29 NOTE — CARE COORDINATION
DISCHARGE ORDER  Date/Time 2021 10:22 AM  Completed by: Donna Carver RN, Case Management    Patient Name: Hoang Walters      : 1955  Admitting Diagnosis: Acute respiratory failure with hypoxia ([facility] [address] [J96.01]  Pneumonia due to COVID-19 virus [U07.1, J12.82]  COVID-19 [U07.1]      Admit order Date and Status:2021  (verify MD's last order for status of admission)      Noted discharge order. If applicable PT/OT recommendation at Discharge: n/a  DME recommendation by PT/OT:n/a  Confirmed discharge plan  (pt): Yes  with whom__________pt_____  If pt confirmed DC plan does family need to be contacted by CM No if yes who____n/a__  Discharge Plan: Reviewed chart. Role of discharge planner explained and patient verbalized understanding. Discharge order is noted. Pt is being d/c'd to home today. Pt's O2 sats are 96% on RA. Pt was walked today by Dr. Katelynn Cowart, and per his notes \"I placed her on 2 L of oxygen. On day of discharge she is 95% on room air at rest and 95% on room air with ambulation'. No further O2 is needed. Pt is Covid positive as of 2021. Hallie Daugherty provided pt a pulse Ox to go home with from the [facility]. CM offered HC for pt and pt declined HC. No further discharge needs needed or noted. Reviewed chart. Role of discharge planner explained and patient verbalized understanding. Discharge order is noted. Has Home O2 in place on admit:  No  Informed of need to bring portable home O2 tank on day of discharge for nursing to connect prior to leaving:   Not Indicated  Verbalized agreement/Understanding:   Not Indicated  Pt is being d/c'd to home today. Pt's O2 sats are 96% on RA. Discharge timeout done with Erica Andrade RN. All discharge needs and concerns addressed.

## 2021-08-29 NOTE — PROGRESS NOTES
Pt resting with eyes closed, respirs witnessed as e/e, no signs of distress. SR up x2, bed in lowest  position, wheels locked. Call light and bedside table in easy reach.    Michelle Garcia, RN, RN

## 2021-08-29 NOTE — PROGRESS NOTES
Pulmonary Progress Note  CC: Covid 19 in unvaccinated individual    Subjective:  *feels better. On room air**      EXAM: BP (!) 152/78   Pulse 60   Temp 96.8 °F (36 °C) (Oral)   Resp 18   Ht 4' 10\" (1.473 m)   Wt 180 lb (81.6 kg)   SpO2 94%   BMI 37.62 kg/m²  on ra  Constitutional:  No acute distress. Eyes: PERRL. Conjunctivae anicteric. ENT: Normal nose. Normal tongue. Neck:  Trachea is midline. No thyroid tenderness. Respiratory: No accessory muscle usage. decreased breath sounds. No wheezes. No rales. No Rhonchi. Cardiovascular: Normal S1S2. No digit clubbing. No digit cyanosis. No LE edema. Psychiatric: No anxiety or Agitation. Alert and Oriented to person, place and time.     Scheduled Meds:   insulin lispro  0-6 Units Subcutaneous TID WC    insulin lispro  0-3 Units Subcutaneous Nightly    pantoprazole  40 mg Oral QAM AC    atorvastatin  40 mg Oral Daily    enoxaparin  30 mg Subcutaneous BID    dexamethasone  6 mg Oral Daily    remdesivir IVPB  100 mg IntraVENous Q24H     Continuous Infusions:   dextrose       PRN Meds:  zolpidem, glucose, dextrose, glucagon (rDNA), dextrose, acetaminophen **OR** acetaminophen, guaiFENesin-dextromethorphan, potassium chloride **OR** potassium alternative oral replacement **OR** potassium chloride, sodium chloride    Labs:  CBC:   Recent Labs     08/27/21  1128 08/28/21  0555   WBC 3.7* 2.6*   HGB 12.4 12.4   HCT 38.4 37.1   MCV 82.0 80.7    215     BMP:   Recent Labs     08/27/21  1128 08/28/21  0555    134*   K 3.2* 3.3*   CL 97* 98*   CO2 25 25   BUN 26* 26*   CREATININE 1.3* 1.1       Chest imaging was reviewed by me and showed 8/27/21 CXR  Patchy bilateral ground-glass opacification consistent with multifocal   pneumonia including atypical viral pneumonia.         ASSESSMENT:  · Covid 19 in unvaccinated individual  · Acute hypoxic respiratory failure  · Leukopenia  · SUNIL  · DM2  · Obesity     PLAN:  · Droplet Plus Airborne Precautions   · Ok to stop remdesivir and d/c home on 4 more days of decadron

## 2021-08-29 NOTE — DISCHARGE SUMMARY
organomegaly  Extremities: extremities normal, atraumatic, no cyanosis or edema  Pulses: 2+ and symmetric  Skin: Skin color, texture, turgor normal. No rashes or lesions  Neurologic: Grossly normal        Hospital Course    #Acute respiratory failure due to COVID-19. Patient was on room air when I saw her but she dropped her oxygen saturation down to 85% and became dyspneic with ambulation to the bathroom once. I placed her on 2 L of oxygen. On day of discharge she is 95% on room air at rest and 95% on room air with ambulation. Will discharge on PO Decadron. Cleared by pulmonary.     #COVID-19 pneumonia. She was started on remdesivir and Decadron. Monitored LFTs and CBC. Today is Decadron day 23. Today is day 2  3 of remdesivir. Pulmonology consulted.      #Obesity. Weight loss recommended as outpatient.     #Hypokalemia. Replace prn.     #Diabetes mellitus type 2. Use sliding scale insulin.     #Asthma. Appears to be stable.     #Hyperlipidemia. On Lipitor.     #Lovenox 30 mg subcu twice daily.     #Insomnia. Use Ambien. CBC: Recent Labs     08/27/21  1128 08/28/21  0555   WBC 3.7* 2.6*   HGB 12.4 12.4   HCT 38.4 37.1   MCV 82.0 80.7    215     BMP:   Recent Labs     08/27/21  1128 08/28/21  0555    134*   K 3.2* 3.3*   CL 97* 98*   CO2 25 25   BUN 26* 26*   CREATININE 1.3* 1.1     LIVER PROFILE:   Recent Labs     08/27/21  1128 08/28/21  0555 08/29/21  0553   AST 36 34 24   ALT 23 24 23   BILIDIR  --  <0.2 <0.2   BILITOT 0.5 0.4 <0.2   ALKPHOS 141* 136* 109   Results for Nilson Hernandez (MRN [id number]) as of 8/29/2021 08:02   Ref. Range 8/27/2021 13:16   INFLUENZA A Latest Ref Range: NOT DETECTED  NOT DETECTED   INFLUENZA B Latest Ref Range: NOT DETECTED  NOT DETECTED   SARS-CoV-2 RNA, RT PCR Latest Ref Range: NOT DETECTED  DETECTED (A)     Results for Nilson Hernandez (MRN [id number]) as of 8/29/2021 08:02   Ref.  Range 8/27/2021 11:28   Lactic Acid Latest Ref Range: 0.4 - 2.0 mmol/L 1.2     Results for Brett Moreno (MRN [id number]) as of 8/29/2021 08:02   Ref. Range 8/27/2021 11:28 8/28/2021 05:55   CRP Latest Ref Range: 0.0 - 5.1 mg/L  57.0 (H)   Troponin Latest Ref Range: <0.01 ng/mL <0.01      XR CHEST PORTABLE   Final Result   Patchy bilateral ground-glass opacification consistent with multifocal   pneumonia including atypical viral pneumonia.             ECG  Normal sinus rhythmCannot rule out Inferior infarct , age undeterminedNonspecific ST abnormalityNo previous ECGs availableConfirmed by JOSÉ MIGUEL Hill MD ([id number]) on 8/27/2021 6:02    Discharge Medications     Medication List      START taking these medications    dexamethasone 6 MG tablet  Commonly known as: DECADRON  Take 1 tablet by mouth daily for 9 doses     guaiFENesin-dextromethorphan 100-10 MG/5ML syrup  Commonly known as: ROBITUSSIN DM  Take 5 mLs by mouth every 4 hours as needed for Cough     pantoprazole 40 MG tablet  Commonly known as: PROTONIX  Take 1 tablet by mouth every morning (before breakfast)  Start taking on: August 30, 2021        CHANGE how you take these medications    fluticasone 50 MCG/ACT nasal spray  Commonly known as: Flonase  2 sprays by Nasal route daily  What changed:   · when to take this  · reasons to take this     valACYclovir 1 g tablet  Commonly known as: VALTREX  Take 1 tablet by mouth 2 times daily  What changed:   · when to take this  · reasons to take this        CONTINUE taking these medications    atorvastatin 40 MG tablet  Commonly known as: LIPITOR  TAKE ONE TABLET BY MOUTH DAILY     PREMPRO PO     triamterene-hydroCHLOROthiazide 75-50 MG per tablet  Commonly known as: MAXZIDE  TAKE ONE TABLET BY MOUTH DAILY           Where to Get Your Medications      These medications were sent to [address], [address] [place] [address], [address] [address]    Phone: [phone]   · dexamethasone 6 MG

## 2021-08-29 NOTE — PROGRESS NOTES
Pt resting with eyes closed, respirs witnessed as e/e, no signs of distress. SR up x2, bed in lowest  position, wheels locked,bed alarm on, Call light and bedside table in easy reach.    Sky Powers, RN, RN

## 2021-08-29 NOTE — DISCHARGE INSTR - COC
Continuity of Care Form    Patient Name: Bryson May   :  1955  MRN:  [id number]    Admit date:  2021  Discharge date:  ***    Code Status Order: No Order   Advance Directives:     Admitting Physician:  Tomer Villalobos MD  PCP: Karime Dutta RN    Discharging Nurse: [facility] Unit/Room#: 0226/0226-01  Discharging Unit Phone Number: ***    Emergency Contact:   Extended Emergency Contact Information  Primary Emergency Contact: Faustina Elena  Home Phone: [phone]  Work Phone: [phone]  Relation: Child  Secondary Emergency Contact: Claritza [address] Phone: [phone]  Mobile Phone: [phone]  Relation: Spouse    Past Surgical History:  Past Surgical History:   Procedure Laterality Date    CARDIAC CATHETERIZATION  2011    Normal    COLONOSCOPY  2008    KNEE ARTHROSCOPY         Immunization History: There is no immunization history for the selected administration types on file for this patient. Active Problems:  Patient Active Problem List   Diagnosis Code    Allergic rhinitis J30.9    Mixed hyperlipidemia E78.2    Asthma A28.391    Cyclic edema T20. 3XXA    Arthritis of knee M17.10    Elevated alkaline phosphatase level R74.8    Hypercalcemia E83.52    Lumbar facet arthropathy M47.816    Morbid obesity with BMI of 40.0-44.9, adult ([facility]) E66.01, Z68.41    COVID-19 U07.1    Acute respiratory failure due to COVID-19 ([facility]) U07.1, J96.00    Type 2 diabetes mellitus with hyperglycemia, without long-term current use of insulin ([facility]) E11.65    Pneumonia due to COVID-19 virus U07.1, J12.82       Isolation/Infection:   Isolation          Droplet Plus        Patient Infection Status     Infection Onset Added Last Indicated Last Indicated By Review Planned Expiration Resolved Resolved By    COVID-19 21 COVID-19 & Influenza Combo 09/03/21 09/10/21      Resolved    COVID-19 Rule Out 21 08/27/21 08/27/21 COVID-19 & Influenza Combo (Ordered)   08/27/21 Rule-Out Test Resulted          Nurse Assessment:  Last Vital Signs: /63   Pulse 52   Temp 97.5 °F (36.4 °C) (Oral)   Resp 18   Ht 4' 10\" (1.473 m)   Wt 180 lb (81.6 kg)   SpO2 96%   BMI 37.62 kg/m²     Last documented pain score (0-10 scale): Pain Level: 0  Last Weight:   Wt Readings from Last 1 Encounters:   08/27/21 180 lb (81.6 kg)     Mental Status:  {IP PT MENTAL STATUS:20030}    IV Access:  { NEGRITA IV ACCESS:675095371}    Nursing Mobility/ADLs:  Walking   {CHP DME IHWZ:413917955}  Transfer  {CHP DME VFPE:715137040}  Bathing  {CHP DME GRJI:617364787}  Dressing  {CHP DME FVEO:486658315}  Toileting  {CHP DME DOKH:349708173}  Feeding  {CHP DME ZLQH:416601882}  Med Admin  {CHP DME EXVH:957824366}  Med Delivery   { NEGRITA MED Delivery:744852161}    Wound Care Documentation and Therapy:        Elimination:  Continence:   · Bowel: {YES / MF:52669}  · Bladder: {YES / AB:97524}  Urinary Catheter: {Urinary Catheter:561573235}   Colostomy/Ileostomy/Ileal Conduit: {YES / DB:39986}       Date of Last BM: ***  No intake or output data in the 24 hours ending 08/29/21 1055  No intake/output data recorded.     Safety Concerns:     [employer] Safety Concerns:835113475}    Impairments/Disabilities:      [employer] Impairments/Disabilities:402191968}    Nutrition Therapy:  Current Nutrition Therapy:   [employer] Diet List:264262314}    Routes of Feeding: {CHP DME Other Feedings:149720133}  Liquids: {Slp liquid thickness:59634}  Daily Fluid Restriction: {CHP DME Yes amt example:784387035}  Last Modified Barium Swallow with Video (Video Swallowing Test): {Done Not Done ZFDZ:905214225}    Treatments at the Time of Hospital Discharge:   Respiratory Treatments: ***  Oxygen Therapy:  {Therapy; copd oxygen:40560}  Ventilator:    {RAFA AGUILLON Vent QTQI:227006746}    Rehab Therapies: {THERAPEUTIC INTERVENTION:6597858933}  Weight Bearing Status/Restrictions: {RAFA AGUILLON Weight Bearin}  Other Medical Equipment (for information only, NOT a DME order):  {EQUIPMENT:335555914}  Other Treatments: ***    Patient's personal belongings (please select all that are sent with patient):  {CHP DME Belongings:435760089}    RN SIGNATURE:  {Esignature:319679086}    CASE MANAGEMENT/SOCIAL WORK SECTION    Inpatient Status Date: ***    Readmission Risk Assessment Score:  Readmission Risk              Risk of Unplanned Readmission:  12           Discharging to Facility/ Agency   · Name:   · Address:  · Phone:  · Fax:    Dialysis Facility (if applicable)   · Name:  · Address:  · Dialysis Schedule:  · Phone:  · Fax:    / signature: {Esignature:201328368}    PHYSICIAN SECTION    Prognosis: {Prognosis:7115349150}    Condition at Discharge: [address] [place] [address] Patient Condition:951249501}    Rehab Potential (if transferring to Rehab): {Prognosis:7690937247}    Recommended Labs or Other Treatments After Discharge: ***    Physician Certification: I certify the above information and transfer of Arsenio Pearce  is necessary for the continuing treatment of the diagnosis listed and that she requires {Admit to Appropriate Level of Care:58246} for {GREATER/LESS:330672296} 30 days.      Update Admission H&P: {CHP DME Changes in GAWHI:302030097}    PHYSICIAN SIGNATURE:  {Esignature:211956644}

## 2021-08-29 NOTE — PROGRESS NOTES
Pt leaving via family vehicle to home. Discharge instructions given. Pt voiced understanding. Copy of discharge and scripts with patient. Iv removed. CP and PE completed. No further needs at discharge. Pt leaving with all personal belonging.

## 2021-08-30 ENCOUNTER — CARE COORDINATION (OUTPATIENT)
Dept: CASE MANAGEMENT | Age: 66
End: 2021-08-30

## 2021-08-30 NOTE — CARE COORDINATION
administration of home medications. Advised obtaining a 90-day supply of all daily and as-needed medications. Covid Risk Education     Educated patient about risk for severe COVID-19 due to risk factors according to CDC guidelines. CTN reviewed discharge instructions, medical action plan and red flag symptoms with the patient who verbalized understanding. Discussed COVID vaccination status: Yes. Education provided on COVID-19 vaccination as appropriate. Discussed exposure protocols and quarantine with CDC Guidelines. Patient was given an opportunity to verbalize any questions and concerns and agrees to contact CTN or health care provider for questions related to their healthcare. Reviewed and educated patient on any new and changed medications related to discharge diagnosis. Was patient discharged with a pulse oximeter? Yes Discussed and confirmed pulse oximeter discharge instructions and when to notify provider or seek emergency care. CTN provided contact information. Plan for follow-up call in 5-7 days based on severity of symptoms and risk factors. Plan for next call: symptom management-., self management-. and follow up appointment-. Spoke with patient who reported she is feeling much better still not 100 % but definitely 70% better. Patient reported her oxygen saturation has been 96-97%. Patient reported she has all her medications and is taking all as prescribed except she is not taking the prescribed pantoprazole instead she resumed her Prilosec. Patient encouraged to continue to monitor her symptoms and if any worsening sob, fever, or oxygen saturation drops below 90 % to return to the hospital. Patient verbalized understanding and reported she was instructed to quarantine for 14 days from the onset of her symptoms so she will be quarantined through 9/3. Patient encouraged to contact her PCP to setup follow up apt as well.  CTN advised patient of use of urgent care or physicians 24 hr access line if assistance is needed after hours. Care Transitions 24 Hour Call    Do you have any ongoing symptoms?: No  Do you have a copy of your discharge instructions?: Yes  Do you have all of your prescriptions and are they filled?: Yes  Have you been contacted by a [address]?: No  Have you scheduled your follow up appointment?: No  Were you discharged with any Home Care or Post Acute Services: No  Do you feel like you have everything you need to keep you well at home?: Yes  Care Transitions Interventions         Follow Up  No future appointments.     Lonnie Zambrano RN

## 2021-09-08 ENCOUNTER — CARE COORDINATION (OUTPATIENT)
Dept: CASE MANAGEMENT | Age: 66
End: 2021-09-08

## 2021-09-08 NOTE — CARE COORDINATION
Patient contacted regarding COVID-19 diagnosis. COVID-19 Detected on 8/27/2021. Unable to reach patient by phone at this time. Message left requesting return call. Gene Ribeiro RN  Care Transition Nurse  [phone] mobile    No future appointments.

## 2021-09-14 ENCOUNTER — CARE COORDINATION (OUTPATIENT)
Dept: CASE MANAGEMENT | Age: 66
End: 2021-09-14

## 2021-09-14 NOTE — CARE COORDINATION
Patient contacted regarding COVID-19 diagnosis. COVID-19 Detected on 8/27/2021. 2nd attempt - Unable to reach patient by phone at this time. Message left including CTN contact info for return call. No further CTN outreach scheduled. Yury Mercado RN  Care Transition Nurse  [phone] mobile    No future appointments.

## 2025-06-04 ENCOUNTER — HOSPITAL ENCOUNTER (OUTPATIENT)
Dept: ULTRASOUND IMAGING | Age: 70
Discharge: HOME | End: 2025-06-04
Attending: HOSPITALIST
Payer: MEDICARE

## 2025-06-04 DIAGNOSIS — T39.395A NEPHROPATHY DUE TO NONSTEROIDAL ANTI-INFLAMMATORY DRUG (NSAID): ICD-10-CM

## 2025-06-04 DIAGNOSIS — N18.32 STAGE 3B CHRONIC KIDNEY DISEASE (HCC): ICD-10-CM

## 2025-06-04 DIAGNOSIS — N14.19 NEPHROPATHY DUE TO NONSTEROIDAL ANTI-INFLAMMATORY DRUG (NSAID): ICD-10-CM

## 2025-06-04 PROCEDURE — 76770 US EXAM ABDO BACK WALL COMP: CPT

## 2025-06-12 ENCOUNTER — HOSPITAL ENCOUNTER (OUTPATIENT)
Dept: LAB | Age: 70
Discharge: HOME | End: 2025-06-12
Payer: MEDICARE

## 2025-06-12 DIAGNOSIS — N18.32 STAGE 3B CHRONIC KIDNEY DISEASE (HCC): ICD-10-CM

## 2025-06-12 DIAGNOSIS — N14.19 NEPHROPATHY DUE TO NONSTEROIDAL ANTI-INFLAMMATORY DRUG (NSAID): ICD-10-CM

## 2025-06-12 DIAGNOSIS — T39.395A NEPHROPATHY DUE TO NONSTEROIDAL ANTI-INFLAMMATORY DRUG (NSAID): ICD-10-CM

## 2025-06-12 LAB
BILIRUB UR QL STRIP.AUTO: NEGATIVE
CLARITY UR: CLEAR
COLOR UR: YELLOW
GLUCOSE UR STRIP.AUTO-MCNC: NEGATIVE MG/DL
HGB UR QL STRIP.AUTO: NEGATIVE
KETONES UR STRIP.AUTO-MCNC: NEGATIVE MG/DL
LEUKOCYTE ESTERASE UR QL STRIP.AUTO: NEGATIVE
NITRITE UR QL STRIP.AUTO: NEGATIVE
PH UR STRIP.AUTO: 6 [PH] (ref 5–8)
PROT UR STRIP.AUTO-MCNC: NEGATIVE MG/DL
SP GR UR STRIP.AUTO: 1.01 (ref 1–1.03)
UA DIPSTICK W REFLEX MICRO PNL UR: NORMAL
URN SPEC COLLECT METH UR: NORMAL
UROBILINOGEN UR STRIP-ACNC: 0.2 E.U./DL

## 2025-06-12 PROCEDURE — 82570 ASSAY OF URINE CREATININE: CPT

## 2025-06-12 PROCEDURE — 81003 URINALYSIS AUTO W/O SCOPE: CPT

## 2025-06-12 PROCEDURE — 86160 COMPLEMENT ANTIGEN: CPT

## 2025-06-12 PROCEDURE — 83880 ASSAY OF NATRIURETIC PEPTIDE: CPT

## 2025-06-12 PROCEDURE — 83521 IG LIGHT CHAINS FREE EACH: CPT

## 2025-06-12 PROCEDURE — 82043 UR ALBUMIN QUANTITATIVE: CPT

## 2025-06-12 PROCEDURE — 80069 RENAL FUNCTION PANEL: CPT

## 2025-06-12 PROCEDURE — 36415 COLL VENOUS BLD VENIPUNCTURE: CPT

## 2025-06-12 PROCEDURE — 82306 VITAMIN D 25 HYDROXY: CPT

## 2025-06-13 LAB
25(OH)D3 SERPL-MCNC: 27 NG/ML
ALBUMIN SERPL-MCNC: 4.2 G/DL (ref 3.4–5)
ANION GAP SERPL CALCULATED.3IONS-SCNC: 12 MMOL/L (ref 3–16)
BUN SERPL-MCNC: 29 MG/DL (ref 7–20)
C3 SERPL-MCNC: 195 MG/DL (ref 90–180)
C4 SERPL-MCNC: 37.9 MG/DL (ref 10–40)
CALCIUM SERPL-MCNC: 10.1 MG/DL (ref 8.3–10.6)
CHLORIDE SERPL-SCNC: 99 MMOL/L (ref 99–110)
CO2 SERPL-SCNC: 28 MMOL/L (ref 21–32)
CREAT SERPL-MCNC: 2 MG/DL (ref 0.6–1.2)
CREAT UR-MCNC: 102 MG/DL (ref 28–259)
GFR SERPLBLD CREATININE-BSD FMLA CKD-EPI: 27 ML/MIN/{1.73_M2}
GLUCOSE SERPL-MCNC: 108 MG/DL (ref 70–99)
KAPPA LC FREE SER-MCNC: 28.3 MG/L (ref 2.37–20.73)
KAPPA LC FREE/LAMBDA FREE SER: 0.92 {RATIO} (ref 0.22–1.74)
LAMBDA LC FREE SERPL-MCNC: 30.7 MG/L (ref 4.23–27.69)
MICROALBUMIN UR DL<=1MG/L-MCNC: <1.2 MG/DL
MICROALBUMIN/CREAT UR: NORMAL MG/G (ref 0–30)
NT-PROBNP SERPL-MCNC: 698 PG/ML (ref 0–124)
PHOSPHATE SERPL-MCNC: 2.9 MG/DL (ref 2.5–4.9)
POTASSIUM SERPL-SCNC: 4 MMOL/L (ref 3.5–5.1)
RPT COMMENT: ABNORMAL
SODIUM SERPL-SCNC: 139 MMOL/L (ref 136–145)

## 2025-06-19 ENCOUNTER — HOSPITAL ENCOUNTER (OUTPATIENT)
Dept: LAB | Age: 70
Discharge: HOME | End: 2025-06-19
Payer: MEDICARE

## 2025-06-19 DIAGNOSIS — N18.32 STAGE 3B CHRONIC KIDNEY DISEASE (HCC): ICD-10-CM

## 2025-06-19 LAB
ALBUMIN SERPL-MCNC: 4.1 G/DL (ref 3.4–5)
ANION GAP SERPL CALCULATED.3IONS-SCNC: 8 MMOL/L (ref 3–16)
BUN SERPL-MCNC: 25 MG/DL (ref 7–20)
CALCIUM SERPL-MCNC: 10.3 MG/DL (ref 8.3–10.6)
CHLORIDE SERPL-SCNC: 105 MMOL/L (ref 99–110)
CO2 SERPL-SCNC: 27 MMOL/L (ref 21–32)
CREAT SERPL-MCNC: 1.8 MG/DL (ref 0.6–1.2)
GFR SERPLBLD CREATININE-BSD FMLA CKD-EPI: 30 ML/MIN/{1.73_M2}
GLUCOSE SERPL-MCNC: 100 MG/DL (ref 70–99)
PHOSPHATE SERPL-MCNC: 3.2 MG/DL (ref 2.5–4.9)
POTASSIUM SERPL-SCNC: 5.1 MMOL/L (ref 3.5–5.1)
SODIUM SERPL-SCNC: 140 MMOL/L (ref 136–145)

## 2025-06-19 PROCEDURE — 36415 COLL VENOUS BLD VENIPUNCTURE: CPT

## 2025-06-19 PROCEDURE — 80069 RENAL FUNCTION PANEL: CPT

## 2025-08-27 RX ORDER — TORSEMIDE 20 MG/1
20 TABLET ORAL
Qty: 120 TABLET | Refills: 1 | Status: SHIPPED | OUTPATIENT
Start: 2025-08-28